# Patient Record
Sex: FEMALE | Race: BLACK OR AFRICAN AMERICAN | Employment: UNEMPLOYED | URBAN - METROPOLITAN AREA
[De-identification: names, ages, dates, MRNs, and addresses within clinical notes are randomized per-mention and may not be internally consistent; named-entity substitution may affect disease eponyms.]

---

## 2017-02-03 PROBLEM — O47.1 FALSE LABOR AFTER 37 WEEKS OF GESTATION WITHOUT DELIVERY: Status: ACTIVE | Noted: 2017-02-03

## 2017-02-21 PROBLEM — O48.0 POST-TERM PREGNANCY, 40-42 WEEKS OF GESTATION: Status: ACTIVE | Noted: 2017-02-21

## 2017-02-25 PROBLEM — O33.2 CEPHALOPELVIC DISPROPORTION DUE TO INLET CONTRACTION OF PELVIS: Status: ACTIVE | Noted: 2017-02-25

## 2017-03-24 PROBLEM — O26.893 VAGINAL DISCHARGE IN PREGNANCY IN THIRD TRIMESTER: Status: ACTIVE | Noted: 2017-03-24

## 2017-03-24 PROBLEM — N89.8 VAGINAL DISCHARGE IN PREGNANCY IN THIRD TRIMESTER: Status: ACTIVE | Noted: 2017-03-24

## 2018-05-23 ENCOUNTER — HOSPITAL ENCOUNTER (EMERGENCY)
Age: 23
Discharge: HOME | End: 2018-05-23
Attending: EMERGENCY MEDICINE
Payer: MEDICAID

## 2018-05-23 VITALS
SYSTOLIC BLOOD PRESSURE: 106 MMHG | BODY MASS INDEX: 19.69 KG/M2 | HEART RATE: 95 BPM | WEIGHT: 107 LBS | RESPIRATION RATE: 16 BRPM | OXYGEN SATURATION: 100 % | DIASTOLIC BLOOD PRESSURE: 59 MMHG | HEIGHT: 62 IN | TEMPERATURE: 98.1 F

## 2018-05-23 DIAGNOSIS — N39.0 URINARY TRACT INFECTION WITHOUT HEMATURIA, SITE UNSPECIFIED: Primary | ICD-10-CM

## 2018-05-23 LAB
ANION GAP SERPL CALCULATED.3IONS-SCNC: 9 MMOL/L (ref 7–16)
ATYPICAL LYMPHOCYTE RELATIVE PERCENT: 1 % (ref 0–4)
BACTERIA: ABNORMAL /HPF
BASOPHILS ABSOLUTE: 0 E9/L (ref 0–0.2)
BASOPHILS RELATIVE PERCENT: 0.3 % (ref 0–2)
BILIRUBIN URINE: NEGATIVE
BLOOD, URINE: ABNORMAL
BUN BLDV-MCNC: 5 MG/DL (ref 6–20)
CALCIUM SERPL-MCNC: 9.2 MG/DL (ref 8.6–10.2)
CHLORIDE BLD-SCNC: 102 MMOL/L (ref 98–107)
CHP ED QC CHECK: YES
CLARITY: ABNORMAL
CO2: 30 MMOL/L (ref 22–29)
COLOR: YELLOW
CREAT SERPL-MCNC: 0.6 MG/DL (ref 0.5–1)
EOSINOPHILS ABSOLUTE: 0.07 E9/L (ref 0.05–0.5)
EOSINOPHILS RELATIVE PERCENT: 1 % (ref 0–6)
EPITHELIAL CELLS, UA: ABNORMAL /HPF
GFR AFRICAN AMERICAN: >60
GFR NON-AFRICAN AMERICAN: >60 ML/MIN/1.73
GLUCOSE BLD-MCNC: 101 MG/DL (ref 74–109)
GLUCOSE URINE: NEGATIVE MG/DL
HCT VFR BLD CALC: 38.8 % (ref 34–48)
HEMOGLOBIN: 12.9 G/DL (ref 11.5–15.5)
KETONES, URINE: NEGATIVE MG/DL
LEUKOCYTE ESTERASE, URINE: ABNORMAL
LYMPHOCYTES ABSOLUTE: 2.08 E9/L (ref 1.5–4)
LYMPHOCYTES RELATIVE PERCENT: 31 % (ref 20–42)
MCH RBC QN AUTO: 31 PG (ref 26–35)
MCHC RBC AUTO-ENTMCNC: 33.2 % (ref 32–34.5)
MCV RBC AUTO: 93.3 FL (ref 80–99.9)
MONOCYTES ABSOLUTE: 0.78 E9/L (ref 0.1–0.95)
MONOCYTES RELATIVE PERCENT: 11.5 % (ref 2–12)
MUCUS: PRESENT
NEUTROPHILS ABSOLUTE: 3.64 E9/L (ref 1.8–7.3)
NEUTROPHILS RELATIVE PERCENT: 55.5 % (ref 43–80)
NITRITE, URINE: NEGATIVE
PDW BLD-RTO: 12.9 FL (ref 11.5–15)
PH UA: 8.5 (ref 5–9)
PLATELET # BLD: 243 E9/L (ref 130–450)
PMV BLD AUTO: 8.6 FL (ref 7–12)
POTASSIUM SERPL-SCNC: 3.8 MMOL/L (ref 3.5–5)
PREGNANCY TEST URINE, POC: NORMAL
PROTEIN UA: ABNORMAL MG/DL
RBC # BLD: 4.16 E12/L (ref 3.5–5.5)
RBC # BLD: NORMAL 10*6/UL
RBC UA: ABNORMAL /HPF (ref 0–2)
SODIUM BLD-SCNC: 141 MMOL/L (ref 132–146)
SPECIFIC GRAVITY UA: 1.02 (ref 1–1.03)
UROBILINOGEN, URINE: 2 E.U./DL
WBC # BLD: 6.5 E9/L (ref 4.5–11.5)
WBC UA: >20 /HPF (ref 0–5)

## 2018-05-23 PROCEDURE — 81001 URINALYSIS AUTO W/SCOPE: CPT

## 2018-05-23 PROCEDURE — 36415 COLL VENOUS BLD VENIPUNCTURE: CPT

## 2018-05-23 PROCEDURE — 87088 URINE BACTERIA CULTURE: CPT

## 2018-05-23 PROCEDURE — 96374 THER/PROPH/DIAG INJ IV PUSH: CPT

## 2018-05-23 PROCEDURE — 85025 COMPLETE CBC W/AUTO DIFF WBC: CPT

## 2018-05-23 PROCEDURE — 2580000003 HC RX 258: Performed by: EMERGENCY MEDICINE

## 2018-05-23 PROCEDURE — 99283 EMERGENCY DEPT VISIT LOW MDM: CPT

## 2018-05-23 PROCEDURE — 80048 BASIC METABOLIC PNL TOTAL CA: CPT

## 2018-05-23 PROCEDURE — 6360000002 HC RX W HCPCS: Performed by: EMERGENCY MEDICINE

## 2018-05-23 RX ORDER — KETOROLAC TROMETHAMINE 30 MG/ML
30 INJECTION, SOLUTION INTRAMUSCULAR; INTRAVENOUS ONCE
Status: COMPLETED | OUTPATIENT
Start: 2018-05-23 | End: 2018-05-23

## 2018-05-23 RX ORDER — 0.9 % SODIUM CHLORIDE 0.9 %
1000 INTRAVENOUS SOLUTION INTRAVENOUS ONCE
Status: COMPLETED | OUTPATIENT
Start: 2018-05-23 | End: 2018-05-23

## 2018-05-23 RX ORDER — PHENAZOPYRIDINE HYDROCHLORIDE 100 MG/1
100 TABLET, FILM COATED ORAL 3 TIMES DAILY PRN
Qty: 6 TABLET | Refills: 0 | Status: SHIPPED | OUTPATIENT
Start: 2018-05-23 | End: 2018-05-26

## 2018-05-23 RX ORDER — NITROFURANTOIN 25; 75 MG/1; MG/1
100 CAPSULE ORAL 2 TIMES DAILY
Qty: 20 CAPSULE | Refills: 0 | Status: SHIPPED | OUTPATIENT
Start: 2018-05-23 | End: 2018-06-02

## 2018-05-23 RX ADMIN — KETOROLAC TROMETHAMINE 30 MG: 30 INJECTION, SOLUTION INTRAMUSCULAR at 13:02

## 2018-05-23 RX ADMIN — SODIUM CHLORIDE 1000 ML: 9 INJECTION, SOLUTION INTRAVENOUS at 13:01

## 2018-05-23 ASSESSMENT — PAIN SCALES - GENERAL
PAINLEVEL_OUTOF10: 0
PAINLEVEL_OUTOF10: 0

## 2018-05-25 LAB — URINE CULTURE, ROUTINE: NORMAL

## 2018-05-28 ENCOUNTER — HOSPITAL ENCOUNTER (EMERGENCY)
Age: 23
Discharge: HOME | End: 2018-05-28
Payer: MEDICAID

## 2018-05-28 VITALS
DIASTOLIC BLOOD PRESSURE: 54 MMHG | RESPIRATION RATE: 16 BRPM | BODY MASS INDEX: 19.14 KG/M2 | TEMPERATURE: 98.1 F | HEART RATE: 92 BPM | OXYGEN SATURATION: 99 % | SYSTOLIC BLOOD PRESSURE: 109 MMHG | WEIGHT: 104 LBS | HEIGHT: 62 IN

## 2018-05-28 DIAGNOSIS — L02.91 ABSCESS: Primary | ICD-10-CM

## 2018-05-28 DIAGNOSIS — A64 STD (FEMALE): ICD-10-CM

## 2018-05-28 LAB
CHP ED QC CHECK: YES
PREGNANCY TEST URINE, POC: NEGATIVE

## 2018-05-28 PROCEDURE — 96372 THER/PROPH/DIAG INJ SC/IM: CPT

## 2018-05-28 PROCEDURE — 6370000000 HC RX 637 (ALT 250 FOR IP): Performed by: NURSE PRACTITIONER

## 2018-05-28 PROCEDURE — 2500000003 HC RX 250 WO HCPCS: Performed by: NURSE PRACTITIONER

## 2018-05-28 PROCEDURE — 6360000002 HC RX W HCPCS

## 2018-05-28 PROCEDURE — 6360000002 HC RX W HCPCS: Performed by: NURSE PRACTITIONER

## 2018-05-28 PROCEDURE — 99283 EMERGENCY DEPT VISIT LOW MDM: CPT

## 2018-05-28 RX ORDER — LIDOCAINE AND PRILOCAINE 25; 25 MG/G; MG/G
CREAM TOPICAL
Qty: 1 TUBE | Refills: 0 | Status: SHIPPED | OUTPATIENT
Start: 2018-05-28

## 2018-05-28 RX ORDER — DOXYCYCLINE HYCLATE 100 MG
100 TABLET ORAL 2 TIMES DAILY
Qty: 20 TABLET | Refills: 0 | Status: SHIPPED | OUTPATIENT
Start: 2018-05-28 | End: 2018-06-07

## 2018-05-28 RX ORDER — CEFTRIAXONE SODIUM 250 MG/1
INJECTION, POWDER, FOR SOLUTION INTRAMUSCULAR; INTRAVENOUS
Status: COMPLETED
Start: 2018-05-28 | End: 2018-05-28

## 2018-05-28 RX ORDER — AZITHROMYCIN 250 MG/1
1000 TABLET, FILM COATED ORAL ONCE
Status: COMPLETED | OUTPATIENT
Start: 2018-05-28 | End: 2018-05-28

## 2018-05-28 RX ADMIN — LIDOCAINE HYDROCHLORIDE 250 MG: 10 INJECTION, SOLUTION EPIDURAL; INFILTRATION; INTRACAUDAL; PERINEURAL at 16:53

## 2018-05-28 RX ADMIN — AZITHROMYCIN DIHYDRATE 1000 MG: 250 TABLET, FILM COATED ORAL at 16:51

## 2018-05-28 RX ADMIN — CEFTRIAXONE 250 MG: 250 INJECTION, POWDER, FOR SOLUTION INTRAMUSCULAR; INTRAVENOUS at 16:53

## 2019-06-23 ENCOUNTER — APPOINTMENT (OUTPATIENT)
Dept: GENERAL RADIOLOGY | Age: 24
End: 2019-06-23

## 2019-06-23 ENCOUNTER — HOSPITAL ENCOUNTER (EMERGENCY)
Age: 24
Discharge: HOME | End: 2019-06-23

## 2019-06-23 VITALS
SYSTOLIC BLOOD PRESSURE: 105 MMHG | OXYGEN SATURATION: 99 % | RESPIRATION RATE: 16 BRPM | WEIGHT: 106 LBS | DIASTOLIC BLOOD PRESSURE: 67 MMHG | HEART RATE: 94 BPM | TEMPERATURE: 98.2 F | BODY MASS INDEX: 19.39 KG/M2

## 2019-06-23 DIAGNOSIS — J06.9 ACUTE UPPER RESPIRATORY INFECTION: ICD-10-CM

## 2019-06-23 DIAGNOSIS — R05.9 COUGH: ICD-10-CM

## 2019-06-23 DIAGNOSIS — R20.2 PARESTHESIA: Primary | ICD-10-CM

## 2019-06-23 PROCEDURE — 6370000000 HC RX 637 (ALT 250 FOR IP): Performed by: NURSE PRACTITIONER

## 2019-06-23 PROCEDURE — 96372 THER/PROPH/DIAG INJ SC/IM: CPT

## 2019-06-23 PROCEDURE — 94664 DEMO&/EVAL PT USE INHALER: CPT

## 2019-06-23 PROCEDURE — 6360000002 HC RX W HCPCS: Performed by: NURSE PRACTITIONER

## 2019-06-23 PROCEDURE — 99284 EMERGENCY DEPT VISIT MOD MDM: CPT

## 2019-06-23 PROCEDURE — 71046 X-RAY EXAM CHEST 2 VIEWS: CPT

## 2019-06-23 RX ORDER — IPRATROPIUM BROMIDE AND ALBUTEROL SULFATE 2.5; .5 MG/3ML; MG/3ML
1 SOLUTION RESPIRATORY (INHALATION) ONCE
Status: COMPLETED | OUTPATIENT
Start: 2019-06-23 | End: 2019-06-23

## 2019-06-23 RX ORDER — METHYLPREDNISOLONE 4 MG/1
TABLET ORAL
Qty: 21 TABLET | Status: SHIPPED | OUTPATIENT
Start: 2019-06-23 | End: 2019-06-29

## 2019-06-23 RX ORDER — METHYLPREDNISOLONE SODIUM SUCCINATE 125 MG/2ML
80 INJECTION, POWDER, LYOPHILIZED, FOR SOLUTION INTRAMUSCULAR; INTRAVENOUS ONCE
Status: COMPLETED | OUTPATIENT
Start: 2019-06-23 | End: 2019-06-23

## 2019-06-23 RX ORDER — AZITHROMYCIN 250 MG/1
TABLET, FILM COATED ORAL
Qty: 6 TABLET | Refills: 0 | Status: SHIPPED | OUTPATIENT
Start: 2019-06-23 | End: 2019-07-03

## 2019-06-23 RX ORDER — BROMPHENIRAMINE MALEATE, PSEUDOEPHEDRINE HYDROCHLORIDE, AND DEXTROMETHORPHAN HYDROBROMIDE 2; 30; 10 MG/5ML; MG/5ML; MG/5ML
5 SYRUP ORAL ONCE
Status: COMPLETED | OUTPATIENT
Start: 2019-06-23 | End: 2019-06-23

## 2019-06-23 RX ORDER — BROMPHENIRAMINE MALEATE, PSEUDOEPHEDRINE HYDROCHLORIDE, AND DEXTROMETHORPHAN HYDROBROMIDE 2; 30; 10 MG/5ML; MG/5ML; MG/5ML
5 SYRUP ORAL 4 TIMES DAILY PRN
Qty: 240 ML | Refills: 0 | Status: SHIPPED | OUTPATIENT
Start: 2019-06-23

## 2019-06-23 RX ADMIN — BROMPHENIRAMINE MALEATE, PSEUDOEPHEDRINE HYDROCHLORIDE, AND DEXTROMETHORPHAN HYDROBROMIDE 5 ML: 2; 10; 30 SYRUP ORAL at 09:28

## 2019-06-23 RX ADMIN — METHYLPREDNISOLONE SODIUM SUCCINATE 80 MG: 125 INJECTION, POWDER, FOR SOLUTION INTRAMUSCULAR; INTRAVENOUS at 09:28

## 2019-06-23 RX ADMIN — IPRATROPIUM BROMIDE AND ALBUTEROL SULFATE 1 AMPULE: .5; 3 SOLUTION RESPIRATORY (INHALATION) at 09:05

## 2019-06-23 ASSESSMENT — PAIN DESCRIPTION - ORIENTATION: ORIENTATION: LEFT

## 2019-06-23 ASSESSMENT — PAIN DESCRIPTION - LOCATION: LOCATION: HAND

## 2019-06-23 ASSESSMENT — PAIN DESCRIPTION - DESCRIPTORS: DESCRIPTORS: NUMBNESS

## 2019-06-23 NOTE — ED PROVIDER NOTES
[facility]     Department of Emergency Medicine   ED  Provider Note  Admit Date/RoomTime: 2019  8:46 AM  ED Room:    Chief Complaint   Cough (productive with \"cloudy white\" sputum; ongoing for a week) and Numbness (left hand; onset yesterday)    History of Present Illness   Source of history provided by:  patient. History/Exam Limitations: none. Sara Espinal is a 26 y.o. old female who has a past medical history of: History reviewed. No pertinent past medical history. presents to the emergency department by private vehicle, with complaints of gradual onset productive cough with sputum described as white and tenacious which began 1 week(s) prior to arrival.  The symptoms are associated with sore throat, cough and dyspnea and denies abdominal pain. She has prior history of occasional episodes of bronchitis in the past.  Since onset the symptoms have been persistent and mild-moderate in severity. The symptoms are aggravated by nothing and relieved by nothing. ROS   Pertinent positives and negatives are stated within HPI, all other systems reviewed and are negative. Past Surgical History:   Procedure Laterality Date     SECTION  2017   Social History:  reports that she has never smoked. She does not have any smokeless tobacco history on file. She reports that she drinks alcohol. She reports that she does not use drugs. Family History: family history includes Asthma in her maternal grandfather; Diabetes in her paternal grandfather; High Blood Pressure in her paternal grandfather; Substance Abuse in her father, maternal grandfather, maternal grandmother, and paternal grandfather. Allergies: Patient has no known allergies.     Physical Exam           ED Triage Vitals [19 0820]   BP Temp Temp Source Pulse Resp SpO2 Height Weight   105/67 98.2 °F (36.8 °C) Oral 94 16 99 % -- 106 lb (48.1 kg)      Oxygen Saturation Interpretation: Normal.    Constitutional:  Alert, The emergency provider has spoken with the patient and discussed todays results, in addition to providing specific details for the plan of care and counseling regarding the diagnosis and prognosis. Questions are answered at this time and they are agreeable with the plan. Assessment      1. Paresthesia    2. Cough    3. Acute upper respiratory infection      Plan   Discharge to home  Patient condition is stable    New Medications     Discharge Medication List as of 6/23/2019  9:37 AM      START taking these medications    Details   azithromycin (ZITHROMAX Z-CATRACHITO) 250 MG tablet Take 2 tabs on day one, followed by 1 tablet daily for 4 days. , Disp-6 tablet, R-0Print      methylPREDNISolone (MEDROL, CATRACHITO,) 4 MG tablet Take as directed on package insert days 1-6, Disp-21 tablet, R-zeroPrint      brompheniramine-pseudoephedrine-DM 2-30-10 MG/5ML syrup Take 5 mLs by mouth 4 times daily as needed for Congestion or Cough, Disp-240 mL, R-0Print           Electronically signed by DYLAN Ayala CNP   DD: 6/23/19  **This report was transcribed using voice recognition software. Every effort was made to ensure accuracy; however, inadvertent computerized transcription errors may be present.   END OF ED PROVIDER NOTE     DYALN Ayala CNP  06/25/19 [id number]

## 2025-06-03 ENCOUNTER — HOSPITAL ENCOUNTER (EMERGENCY)
Facility: HOSPITAL | Age: 30
Discharge: HOME | End: 2025-06-04
Payer: MEDICAID

## 2025-06-03 ENCOUNTER — APPOINTMENT (OUTPATIENT)
Dept: RADIOLOGY | Facility: HOSPITAL | Age: 30
End: 2025-06-03
Payer: MEDICAID

## 2025-06-03 VITALS
HEART RATE: 83 BPM | DIASTOLIC BLOOD PRESSURE: 71 MMHG | BODY MASS INDEX: 21.16 KG/M2 | RESPIRATION RATE: 18 BRPM | SYSTOLIC BLOOD PRESSURE: 110 MMHG | OXYGEN SATURATION: 98 % | HEIGHT: 62 IN | WEIGHT: 115 LBS | TEMPERATURE: 97.5 F

## 2025-06-03 DIAGNOSIS — T07.XXXA MULTIPLE ABRASIONS: ICD-10-CM

## 2025-06-03 DIAGNOSIS — M79.671 RIGHT FOOT PAIN: Primary | ICD-10-CM
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
## 2025-06-03 PROBLEM — Z98.51 S/P TUBAL LIGATION: Status: ACTIVE | Noted: 2023-04-17

## 2025-06-03 PROCEDURE — 2500000001 HC RX 250 WO HCPCS SELF ADMINISTERED DRUGS (ALT 637 FOR MEDICARE OP): Performed by: PHYSICIAN ASSISTANT

## 2025-06-03 PROCEDURE — 73630 X-RAY EXAM OF FOOT: CPT | Mod: RT

## 2025-06-03 PROCEDURE — 73610 X-RAY EXAM OF ANKLE: CPT | Mod: RIGHT SIDE | Performed by: RADIOLOGY

## 2025-06-03 PROCEDURE — 73610 X-RAY EXAM OF ANKLE: CPT | Mod: RT

## 2025-06-03 PROCEDURE — 99284 EMERGENCY DEPT VISIT MOD MDM: CPT

## 2025-06-03 PROCEDURE — 73630 X-RAY EXAM OF FOOT: CPT | Mod: RIGHT SIDE | Performed by: RADIOLOGY

## 2025-06-03 RX ORDER — BACITRACIN ZINC 500 UNIT/G
1 OINTMENT (GRAM) TOPICAL 2 TIMES DAILY
Qty: 28 G | Refills: 0 | Status: SHIPPED | OUTPATIENT
Start: 2025-06-03

## 2025-06-03 RX ORDER — TRAMADOL HYDROCHLORIDE 50 MG/1
50 TABLET, FILM COATED ORAL EVERY 6 HOURS PRN
Qty: 6 TABLET | Refills: 0 | Status: SHIPPED | OUTPATIENT
Start: 2025-06-03

## 2025-06-03 RX ORDER — IBUPROFEN 600 MG/1
600 TABLET, FILM COATED ORAL ONCE
Status: DISCONTINUED | OUTPATIENT
Start: 2025-06-03 | End: 2025-06-04 | Stop reason: HOSPADM

## 2025-06-03 RX ORDER — BACITRACIN ZINC 500 UNIT/G
OINTMENT IN PACKET (EA) TOPICAL ONCE
Status: DISCONTINUED | OUTPATIENT
Start: 2025-06-03 | End: 2025-06-04 | Stop reason: HOSPADM

## 2025-06-03 RX ORDER — HYDROCODONE BITARTRATE AND ACETAMINOPHEN 5; 325 MG/1; MG/1
1 TABLET ORAL ONCE
Refills: 0 | Status: COMPLETED | OUTPATIENT
Start: 2025-06-03 | End: 2025-06-03

## 2025-06-03 RX ORDER — IBUPROFEN 600 MG/1
600 TABLET, FILM COATED ORAL EVERY 6 HOURS PRN
Qty: 30 TABLET | Refills: 0 | Status: SHIPPED | OUTPATIENT
Start: 2025-06-03

## 2025-06-03 RX ADMIN — HYDROCODONE BITARTRATE AND ACETAMINOPHEN 1 TABLET: 5; 325 TABLET ORAL at 20:54

## 2025-06-03 RX ADMIN — Medication 1 APPLICATION: at 20:55

## 2025-06-03 ASSESSMENT — PAIN - FUNCTIONAL ASSESSMENT: PAIN_FUNCTIONAL_ASSESSMENT: 0-10

## 2025-06-03 ASSESSMENT — COLUMBIA-SUICIDE SEVERITY RATING SCALE - C-SSRS
2. HAVE YOU ACTUALLY HAD ANY THOUGHTS OF KILLING YOURSELF?: NO
6. HAVE YOU EVER DONE ANYTHING, STARTED TO DO ANYTHING, OR PREPARED TO DO ANYTHING TO END YOUR LIFE?: NO
1. IN THE PAST MONTH, HAVE YOU WISHED YOU WERE DEAD OR WISHED YOU COULD GO TO SLEEP AND NOT WAKE UP?: NO

## 2025-06-03 ASSESSMENT — PAIN SCALES - GENERAL: PAINLEVEL_OUTOF10: 8

## 2025-06-03 ASSESSMENT — PAIN DESCRIPTION - PROGRESSION: CLINICAL_PROGRESSION: NOT CHANGED

## 2025-06-03 ASSESSMENT — PAIN DESCRIPTION - ORIENTATION: ORIENTATION: RIGHT

## 2025-06-03 ASSESSMENT — PAIN DESCRIPTION - LOCATION: LOCATION: ANKLE

## 2025-06-03 NOTE — Clinical Note
Latoya Cee was seen and treated in our emergency department on 6/3/2025.  She may return to work on 06/06/2025.       If you have any questions or concerns, please don't hesitate to call.      Kamille Greenwood PA-C

## 2025-06-04 NOTE — DISCHARGE INSTRUCTIONS
Please keep wounds clean and dry.  Use gentle soap and water 1-2 times per day.  Pat dry.  Using a Q-tip, apply topical antibiotic ointment 1-2 times per day this will aid in wound healing.  May continue Tylenol every 4-6 hours and/or ibuprofen every 6-8 hours as needed for pain.  May take tramadol as needed for severe pain.  This is narcotic.  Do not drive, drink alcohol or operate heavy machinery while taking this medication.  Do not take other sedating medications when taking narcotics.  Narcotics can cause constipation.  Drink extra water and eat extra fiber to help prevent this. May take stool softners as needed.    Follow-up with primary care provider.

## 2025-06-04 NOTE — ED PROVIDER NOTES
Chief Complaint   Patient presents with    Ankle Pain     HPI:   Latoya Cee is an 29 y.o. female with history of tubal ligation that presents to the ED for evaluation of right foot and ankle pain.  Injury occurred yesterday around 1730.  Patient reports that she has autistic son and yesterday he was running towards the street.  She was chasing him and fell in her driveway.  She experienced road rash of the right foot and ankle as well as her right hip/buttock.  She cleaned it with the first-aid kit she has at home.  Wrapped it with an Ace wrap.  Since then she has had constant 8/10 pain of the foot ankle and hip.  Pain is worse with movement and walking.  No relieving factors.  Rates a 8/10.  Has not taken any medication for pain.  She is unsure of last tetanus.  LMP last month.  Denies chance of pregnancy.  Denies hitting her head or losing consciousness.  Denies any head or neck pain.  Denies any numbness, paresthesias, muscle weakness.    Medications: Denies any   Soc HX:  RX Allergies[1]: NKDA    Physical Exam  Vitals and nursing note reviewed.   Constitutional:       General: She is not in acute distress.     Appearance: Normal appearance. She is not ill-appearing or toxic-appearing.   HENT:      Head: Normocephalic and atraumatic.      Comments: No signs of basilar skull fracture  Eyes:      Pupils: Pupils are equal, round, and reactive to light.   Cardiovascular:      Rate and Rhythm: Normal rate.      Pulses: Normal pulses.   Pulmonary:      Effort: Pulmonary effort is normal. No respiratory distress.   Musculoskeletal:         General: Signs of injury present. No deformity.      Cervical back: Normal range of motion. No tenderness.      Comments: ROM of right foot and ankle limited by pain.  She can still actively move all toes.  Compartments soft.   Skin:     General: Skin is warm and dry.      Capillary Refill: Capillary refill takes less than 2 seconds.      Findings: No bruising.      Comments:  Multiple abrasions/road rash of the right mid to lateral foot and ankle   Neurological:      Mental Status: She is alert.      Cranial Nerves: No cranial nerve deficit.      Sensory: No sensory deficit.              VS: As documented in the triage note and EMR flowsheet from this visit were reviewed.    Medical Decision Making:   ED Course as of 06/03/25 2350   Tue Jun 03, 2025 2055 Vitals Reviewed: Afebrile. Normotensive. Not tachycardic nor tachypneic. No hypoxia.   [KA]   2111 Patient is a 29-year-old female that presents to the ED for evaluation of right foot and ankle pain after a fall that occurred yesterday.  On exam she has multiple abrasions to the lateral aspect of her right foot and ankle.  Limited range of motion secondary to pain.  She has normal distal pulses.  Sensation intact.  Will obtain x-ray imaging.  Patient to be given Norco and ibuprofen for pain.  I have asked nursing staff to cleanse and irrigate wound prior to applying bacitracin and Vaseline gauze and dressing.  She denies any head or losing consciousness.  Has no head or neck pain.  [place] head CT and [facility] do not recommend imaging.  Reviewed chart.  Last tetanus 2023. [KA]   2146 I personally viewed x-ray imaging and see no obvious fracture or dislocation. [KA]   2211 Radiology concurs.  No acute fracture no dislocation of the foot or ankle [KA]   2349 Wound care provided.  Patient able to verbalize understanding wound care instructions using teach back method.  Will send home prescription for bacitracin, ibuprofen.  Reviewed OAR[facility] with no concerning findings.  Will give short course of tramadol.  Advise follow-up with primary care provider and return to ED for any new or worsening symptoms. [KA]      ED Course User Index  [KA] Kamille Greenwood PA-C         Diagnoses as of 06/03/25 2350   Right foot pain   Multiple abrasions   Fall, initial encounter      Escalation of Care: Appropriate for outpatient manage     Counseling:  Spoke with the patient and discussed today´s findings, in addition to providing specific details for the plan of care and expected course.  Patient was given the opportunity to ask questions.    Discussed return precautions and importance of follow-up.  Advised to follow-up with PCP.  Advised to return to the ED for changing or worsening symptoms, new symptoms, complaint specific precautions, and precautions listed on the discharge paperwork.  Educated on the common potential side effects of medications prescribed.    I advised the patient that the emergency evaluation and treatment provided today doesn't end their need for medical care. It is very important that they follow-up with their primary care provider or other specialist as instructed.    The plan of care was mutually agreed upon with the patient. The patient and/or family were given the opportunity to ask questions. All questions asked today in the ED were answered to the best of my ability with today's information.    I specifically advised the patient to return to the ED for changing or worsening symptoms, worrisome new symptoms, or for any complaint specific precautions listed on the discharge paperwork.    This patient was cared for in the setting of nationwide stress on resources and staffing.    This report was transcribed using voice recognition software.  Every effort was made to ensure accuracy, however, inadvertently computerized transcription errors may be present.         [1] No Known Allergies       Kamille Greenwood PA-C  06/03/25 [id number]

## 2025-06-05 ENCOUNTER — APPOINTMENT (OUTPATIENT)
Dept: PRIMARY CARE | Facility: CLINIC | Age: 30
End: 2025-06-05
Payer: MEDICAID

## 2025-06-10 ENCOUNTER — OFFICE VISIT (OUTPATIENT)
Dept: PRIMARY CARE | Facility: CLINIC | Age: 30
End: 2025-06-10
Payer: MEDICAID

## 2025-06-10 VITALS
SYSTOLIC BLOOD PRESSURE: 95 MMHG | DIASTOLIC BLOOD PRESSURE: 65 MMHG | BODY MASS INDEX: 20.92 KG/M2 | WEIGHT: 114.4 LBS | HEART RATE: 73 BPM | OXYGEN SATURATION: 98 %

## 2025-06-10 DIAGNOSIS — Z76.89 ESTABLISHING CARE WITH NEW DOCTOR, ENCOUNTER FOR: Primary | ICD-10-CM

## 2025-06-10 DIAGNOSIS — S90.811D ABRASION OF RIGHT FOOT, SUBSEQUENT ENCOUNTER: ICD-10-CM

## 2025-06-10 DIAGNOSIS — S99.921D INJURY OF RIGHT FOOT, SUBSEQUENT ENCOUNTER: ICD-10-CM

## 2025-06-10 PROCEDURE — 1036F TOBACCO NON-USER: CPT | Performed by: PHYSICIAN ASSISTANT

## 2025-06-10 PROCEDURE — 99204 OFFICE O/P NEW MOD 45 MIN: CPT | Performed by: PHYSICIAN ASSISTANT

## 2025-06-10 ASSESSMENT — ENCOUNTER SYMPTOMS
HEADACHES: 0
DEPRESSION: 0
RESPIRATORY NEGATIVE: 1
COUGH: 0
SHORTNESS OF BREATH: 0
MUSCULOSKELETAL NEGATIVE: 1
DIZZINESS: 0
NERVOUS/ANXIOUS: 0
CONSTITUTIONAL NEGATIVE: 1
ALLERGIC/IMMUNOLOGIC NEGATIVE: 1
ARTHRALGIAS: 0
PSYCHIATRIC NEGATIVE: 1
NAUSEA: 0
ENDOCRINE NEGATIVE: 1
EYES NEGATIVE: 1
WOUND: 1
OCCASIONAL FEELINGS OF UNSTEADINESS: 0
PALPITATIONS: 0
ABDOMINAL PAIN: 0
HEMATOLOGIC/LYMPHATIC NEGATIVE: 1
LOSS OF SENSATION IN FEET: 0
BACK PAIN: 0
WHEEZING: 0
VOMITING: 0
NEUROLOGICAL NEGATIVE: 1

## 2025-06-10 ASSESSMENT — PATIENT HEALTH QUESTIONNAIRE - PHQ9
SUM OF ALL RESPONSES TO PHQ9 QUESTIONS 1 AND 2: 0
2. FEELING DOWN, DEPRESSED OR HOPELESS: NOT AT ALL
1. LITTLE INTEREST OR PLEASURE IN DOING THINGS: NOT AT ALL

## 2025-06-10 NOTE — LETTER
Kamilah 10, 2025     Patient: Latoya Cee   YOB: 1995   Date of Visit: 6/10/2025       To Whom It May Concern:    Latoya Cee was seen in my clinic on 6/10/2025 at 1:00 pm. Please excuse Latoya for her absence from work on this day to make the appointment. And off work till 6/18/2025     If you have any questions or concerns, please don't hesitate to call.         Sincerely,         Anh Aleman PA-C        CC: No Recipients

## 2025-06-10 NOTE — PROGRESS NOTES
Subjective   Patient ID: Latoya Cee is a 29 y.o. female who presents for New Patient Visit and Foot Injury (Right foot injury / in boot - 6/2 ).    HPI   Patient Latoya Cee 29 y.o. female is here today to establish care and follow up ER   Latoya Cee is an 29 y.o. female with history of tubal ligation that presented to the ED for evaluation of right foot and ankle pain.  Injury occurred 6/2 - 1 week ago .  Patient reports that she has autistic son was running towards the street.  She was chasing him and fell in her driveway.  She experienced road rash of the right foot and ankle as well as her right hip/buttock.  She cleaned it with the first-aid kit she has at home.  Wrapped it with an Ace wrap.   then she had constant 8/10 pain of the foot ankle and hip and went to the ER .  Pain is  worse with movement and walking.  Pt in boot per her self   Dressing in place        LMP last month.  Denies chance of pregnancy.  Denies hitting her head or losing consciousness.  Denies any head or neck pain.  Denies any numbness, paresthesias, muscle weakness.   previous PCP none       single, works as [employer] - [employer] - 3 kids 8-3 and 1 yo     specialists gyn   [employer]     PMhx significant medical hx none   PShx: c section 2017  - tubal 2023   Social hx: etoh wine - infrequent , no tobacco use, no illicit drug use     Past medical, surgical, social, and family history all reviewed     patient states feeling well otherwise  denies fever, chills, N/V, headache, dizziness, CP, SOB, palpitations, edema, numbness, tingling, weakness  A chaperone was offered to the patient for the physical exam /  exam and was declined         Review of Systems   Constitutional: Negative.    HENT: Negative.     Eyes: Negative.    Respiratory: Negative.  Negative for cough, shortness of breath and wheezing.    Cardiovascular:  Negative for chest pain and palpitations.   Gastrointestinal:  Negative for  abdominal pain, nausea and vomiting.   Endocrine: Negative.    Genitourinary: Negative.    Musculoskeletal: Negative.  Negative for arthralgias and back pain.        Right ankle pain    Skin:  Positive for wound. Negative for rash.   Allergic/Immunologic: Negative.    Neurological: Negative.  Negative for dizziness and headaches.   Hematological: Negative.    Psychiatric/Behavioral: Negative.  The patient is not nervous/anxious.        Objective   BP 95/65 (BP Location: Right arm, Patient Position: Sitting)   Pulse 73   Wt 51.9 kg (114 lb 6.4 oz)   SpO2 98%   BMI 20.92 kg/m²     Physical Exam  Vitals and nursing note reviewed.   Constitutional:       Appearance: Normal appearance.   Cardiovascular:      Rate and Rhythm: Normal rate and regular rhythm.   Pulmonary:      Effort: Pulmonary effort is normal.      Breath sounds: Normal breath sounds.   Abdominal:      General: Bowel sounds are normal.   Musculoskeletal:         General: Swelling, tenderness and signs of injury present. No deformity.      Cervical back: Neck supple.      Comments: DROM right foot and ankle 2nd to pain     Skin:     Findings: No bruising, erythema or rash.      Comments: + healing wound+ painful -right lateral foot and ankle - dry - no signs of infection no bleeding      Neurological:      General: No focal deficit present.      Mental Status: She is alert and oriented to person, place, and time.   Psychiatric:         Mood and Affect: Mood normal.         Behavior: Behavior normal.         Thought Content: Thought content normal.         Judgment: Judgment normal.         Assessment/Plan   Problem List Items Addressed This Visit    None  Visit Diagnoses         Establishing care with new doctor, encounter for    -  Primary      Injury of right foot, subsequent encounter        Relevant Orders    Referral to Wound Clinic      Abrasion of right foot, subsequent encounter        Relevant Orders    Referral to Wound Clinic        Cleaned  and dressings placed and wound care - stable     complexity visit of 40+   minutes face-to-face with at least half of the time discussing  Results of my examination, clinical findings, impression and diagnoses discussed with the patient as well as results of the latest test/lab work. The patient was in agreement with the plan and verbalized a good understanding of instructions and plans for treatment. At the end of the visit today, the patient felt that all questions had been answered satisfactorily. The patient was pleased with the visit and very appreciative for the care rendered.